# Patient Record
Sex: MALE | Race: ASIAN | NOT HISPANIC OR LATINO | URBAN - METROPOLITAN AREA
[De-identification: names, ages, dates, MRNs, and addresses within clinical notes are randomized per-mention and may not be internally consistent; named-entity substitution may affect disease eponyms.]

---

## 2017-03-19 ENCOUNTER — EMERGENCY (EMERGENCY)
Facility: HOSPITAL | Age: 42
LOS: 1 days | Discharge: HOME | End: 2017-03-19
Attending: EMERGENCY MEDICINE | Admitting: EMERGENCY MEDICINE
Payer: COMMERCIAL

## 2017-03-19 VITALS
SYSTOLIC BLOOD PRESSURE: 212 MMHG | HEART RATE: 104 BPM | OXYGEN SATURATION: 99 % | RESPIRATION RATE: 16 BRPM | DIASTOLIC BLOOD PRESSURE: 143 MMHG

## 2017-03-19 VITALS
SYSTOLIC BLOOD PRESSURE: 199 MMHG | TEMPERATURE: 98 F | HEART RATE: 107 BPM | RESPIRATION RATE: 18 BRPM | OXYGEN SATURATION: 100 % | DIASTOLIC BLOOD PRESSURE: 152 MMHG

## 2017-03-19 LAB
ALBUMIN SERPL ELPH-MCNC: 4.2 G/DL — SIGNIFICANT CHANGE UP (ref 3.3–5)
ALP SERPL-CCNC: 79 U/L — SIGNIFICANT CHANGE UP (ref 40–120)
ALT FLD-CCNC: 17 U/L — SIGNIFICANT CHANGE UP (ref 4–41)
APPEARANCE UR: CLEAR — SIGNIFICANT CHANGE UP
APTT BLD: 35.5 SEC — SIGNIFICANT CHANGE UP (ref 27.5–37.4)
AST SERPL-CCNC: 17 U/L — SIGNIFICANT CHANGE UP (ref 4–40)
BASOPHILS # BLD AUTO: 0.02 K/UL — SIGNIFICANT CHANGE UP (ref 0–0.2)
BASOPHILS NFR BLD AUTO: 0.3 % — SIGNIFICANT CHANGE UP (ref 0–2)
BILIRUB SERPL-MCNC: 0.4 MG/DL — SIGNIFICANT CHANGE UP (ref 0.2–1.2)
BILIRUB UR-MCNC: NEGATIVE — SIGNIFICANT CHANGE UP
BLOOD UR QL VISUAL: HIGH
BUN SERPL-MCNC: 14 MG/DL — SIGNIFICANT CHANGE UP (ref 7–23)
CALCIUM SERPL-MCNC: 8.9 MG/DL — SIGNIFICANT CHANGE UP (ref 8.4–10.5)
CHLORIDE SERPL-SCNC: 101 MMOL/L — SIGNIFICANT CHANGE UP (ref 98–107)
CK MB BLD-MCNC: 1.88 NG/ML — SIGNIFICANT CHANGE UP (ref 1–6.6)
CK MB BLD-MCNC: 1.89 NG/ML — SIGNIFICANT CHANGE UP (ref 1–6.6)
CK SERPL-CCNC: 110 U/L — SIGNIFICANT CHANGE UP (ref 30–200)
CK SERPL-CCNC: 110 U/L — SIGNIFICANT CHANGE UP (ref 30–200)
CO2 SERPL-SCNC: 23 MMOL/L — SIGNIFICANT CHANGE UP (ref 22–31)
COLOR SPEC: SIGNIFICANT CHANGE UP
CREAT SERPL-MCNC: 1.52 MG/DL — HIGH (ref 0.5–1.3)
EOSINOPHIL # BLD AUTO: 0.16 K/UL — SIGNIFICANT CHANGE UP (ref 0–0.5)
EOSINOPHIL NFR BLD AUTO: 2.1 % — SIGNIFICANT CHANGE UP (ref 0–6)
GLUCOSE SERPL-MCNC: 127 MG/DL — HIGH (ref 70–99)
GLUCOSE UR-MCNC: NEGATIVE — SIGNIFICANT CHANGE UP
HCT VFR BLD CALC: 47.1 % — SIGNIFICANT CHANGE UP (ref 39–50)
HGB BLD-MCNC: 15.8 G/DL — SIGNIFICANT CHANGE UP (ref 13–17)
IMM GRANULOCYTES NFR BLD AUTO: 0.1 % — SIGNIFICANT CHANGE UP (ref 0–1.5)
INR BLD: 0.99 — SIGNIFICANT CHANGE UP (ref 0.88–1.17)
KETONES UR-MCNC: NEGATIVE — SIGNIFICANT CHANGE UP
LEUKOCYTE ESTERASE UR-ACNC: NEGATIVE — SIGNIFICANT CHANGE UP
LYMPHOCYTES # BLD AUTO: 2.18 K/UL — SIGNIFICANT CHANGE UP (ref 1–3.3)
LYMPHOCYTES # BLD AUTO: 28.4 % — SIGNIFICANT CHANGE UP (ref 13–44)
MCHC RBC-ENTMCNC: 29 PG — SIGNIFICANT CHANGE UP (ref 27–34)
MCHC RBC-ENTMCNC: 33.5 % — SIGNIFICANT CHANGE UP (ref 32–36)
MCV RBC AUTO: 86.6 FL — SIGNIFICANT CHANGE UP (ref 80–100)
MONOCYTES # BLD AUTO: 0.45 K/UL — SIGNIFICANT CHANGE UP (ref 0–0.9)
MONOCYTES NFR BLD AUTO: 5.9 % — SIGNIFICANT CHANGE UP (ref 2–14)
NEUTROPHILS # BLD AUTO: 4.85 K/UL — SIGNIFICANT CHANGE UP (ref 1.8–7.4)
NEUTROPHILS NFR BLD AUTO: 63.2 % — SIGNIFICANT CHANGE UP (ref 43–77)
NITRITE UR-MCNC: NEGATIVE — SIGNIFICANT CHANGE UP
NT-PROBNP SERPL-SCNC: 144.7 PG/ML — SIGNIFICANT CHANGE UP
PH UR: 7 — SIGNIFICANT CHANGE UP (ref 4.6–8)
PLATELET # BLD AUTO: 232 K/UL — SIGNIFICANT CHANGE UP (ref 150–400)
PMV BLD: 11.3 FL — SIGNIFICANT CHANGE UP (ref 7–13)
POTASSIUM SERPL-MCNC: 3.8 MMOL/L — SIGNIFICANT CHANGE UP (ref 3.5–5.3)
POTASSIUM SERPL-SCNC: 3.8 MMOL/L — SIGNIFICANT CHANGE UP (ref 3.5–5.3)
PROT SERPL-MCNC: 7.1 G/DL — SIGNIFICANT CHANGE UP (ref 6–8.3)
PROT UR-MCNC: 30 — SIGNIFICANT CHANGE UP
PROTHROM AB SERPL-ACNC: 11.1 SEC — SIGNIFICANT CHANGE UP (ref 9.8–13.1)
RBC # BLD: 5.44 M/UL — SIGNIFICANT CHANGE UP (ref 4.2–5.8)
RBC # FLD: 13 % — SIGNIFICANT CHANGE UP (ref 10.3–14.5)
RBC CASTS # UR COMP ASSIST: SIGNIFICANT CHANGE UP (ref 0–?)
SODIUM SERPL-SCNC: 141 MMOL/L — SIGNIFICANT CHANGE UP (ref 135–145)
SP GR SPEC: 1.03 — SIGNIFICANT CHANGE UP (ref 1–1.03)
SQUAMOUS # UR AUTO: SIGNIFICANT CHANGE UP
TROPONIN T SERPL-MCNC: < 0.06 NG/ML — SIGNIFICANT CHANGE UP (ref 0–0.06)
TROPONIN T SERPL-MCNC: < 0.06 NG/ML — SIGNIFICANT CHANGE UP (ref 0–0.06)
UROBILINOGEN FLD QL: NORMAL E.U. — SIGNIFICANT CHANGE UP (ref 0.1–0.2)
WBC # BLD: 7.67 K/UL — SIGNIFICANT CHANGE UP (ref 3.8–10.5)
WBC # FLD AUTO: 7.67 K/UL — SIGNIFICANT CHANGE UP (ref 3.8–10.5)
WBC UR QL: SIGNIFICANT CHANGE UP (ref 0–?)

## 2017-03-19 PROCEDURE — 93010 ELECTROCARDIOGRAM REPORT: CPT | Mod: 59

## 2017-03-19 PROCEDURE — 74174 CTA ABD&PLVS W/CONTRAST: CPT | Mod: 26

## 2017-03-19 PROCEDURE — 71020: CPT | Mod: 26

## 2017-03-19 PROCEDURE — 71275 CT ANGIOGRAPHY CHEST: CPT | Mod: 26

## 2017-03-19 PROCEDURE — 99285 EMERGENCY DEPT VISIT HI MDM: CPT | Mod: 25

## 2017-03-19 RX ORDER — LISINOPRIL 2.5 MG/1
1 TABLET ORAL
Qty: 30 | Refills: 0 | OUTPATIENT
Start: 2017-03-19 | End: 2017-04-18

## 2017-03-19 RX ORDER — LISINOPRIL 2.5 MG/1
10 TABLET ORAL DAILY
Qty: 0 | Refills: 0 | Status: DISCONTINUED | OUTPATIENT
Start: 2017-03-19 | End: 2017-03-23

## 2017-03-19 RX ORDER — NIFEDIPINE 30 MG
1 TABLET, EXTENDED RELEASE 24 HR ORAL
Qty: 30 | Refills: 0 | OUTPATIENT
Start: 2017-03-19 | End: 2017-04-18

## 2017-03-19 RX ORDER — NIFEDIPINE 30 MG
60 TABLET, EXTENDED RELEASE 24 HR ORAL ONCE
Qty: 0 | Refills: 0 | Status: COMPLETED | OUTPATIENT
Start: 2017-03-19 | End: 2017-03-19

## 2017-03-19 RX ADMIN — LISINOPRIL 10 MILLIGRAM(S): 2.5 TABLET ORAL at 21:13

## 2017-03-19 RX ADMIN — Medication 60 MILLIGRAM(S): at 17:36

## 2017-03-19 NOTE — ED PROVIDER NOTE - PROGRESS NOTE DETAILS
Bp 194/135. Pt feeling better.  Serial EKGs unchanged, serial CE negative.  Will rx procardia and lisinopril.  Pt will try to f/u PMD tomrrow.

## 2017-03-19 NOTE — ED ADULT NURSE NOTE - OBJECTIVE STATEMENT
Pt recd A+Ox3. Sent from urgent care for high BP. Originally went to urgent care for abd pain that he felt when he was shoveling snow. Pt denies any CP, abd pain, N/V, SOB, HA, dizziness or any other adverse symptoms at this time. Reports he had some CP this morning but denies at this time. Pt is extremely hypertensive on assessment- MD at bedside aware. Cardiac monitor in place and labs sent. Will continue to monitor.

## 2017-03-19 NOTE — ED PROVIDER NOTE - OBJECTIVE STATEMENT
41 YOM PMH HTN on medication he cannot remember presented at  with epigastric pain sent to ED for HTN p/w no symptoms today.  Pt c/o epigastric pain episodic 3-4/10 which brought him to  today. Pt was hypertensive into the 240's sent to the ED.  Mild chest pain earlier today.  No active chest pain in the ED.  No shortness of breath, radiation to the arm, jaw, neck or back, worsening symptoms with exertion, headache, nausea, or vomiting.  No history of stress test, angiogram, echocardiogram, or follow up with a cardiologist.  No personal history of coronary artery disease, arrhythmia, or CHF.  No family history of coronary artery disease, sudden cardiac death, or arrhythmia.

## 2017-03-19 NOTE — ED PROVIDER NOTE - MEDICAL DECISION MAKING DETAILS
41 YOM PMH HTN on medication he cannot remember presented at  with epigastric pain sent to ED for HTN p/w no symptoms today. HTN.  Evaluate for hypertensive emergency, no clinical findings consistent with dissection, R/O ACS.  Treat symptomatically and reassess.

## 2017-03-19 NOTE — ED ADULT TRIAGE NOTE - CHIEF COMPLAINT QUOTE
pt sent from urgent care , for elevated BP. pt with a c/o chest pressure. pt denies sob. pt is non compliant with BP meds.   pmhx HTN.

## 2021-01-16 ENCOUNTER — EMERGENCY (EMERGENCY)
Facility: HOSPITAL | Age: 46
LOS: 1 days | Discharge: HOME | End: 2021-01-16
Attending: STUDENT IN AN ORGANIZED HEALTH CARE EDUCATION/TRAINING PROGRAM | Admitting: STUDENT IN AN ORGANIZED HEALTH CARE EDUCATION/TRAINING PROGRAM
Payer: COMMERCIAL

## 2021-01-16 VITALS
RESPIRATION RATE: 20 BRPM | OXYGEN SATURATION: 97 % | DIASTOLIC BLOOD PRESSURE: 76 MMHG | SYSTOLIC BLOOD PRESSURE: 110 MMHG | HEART RATE: 107 BPM | TEMPERATURE: 98 F

## 2021-01-16 VITALS
DIASTOLIC BLOOD PRESSURE: 78 MMHG | HEART RATE: 90 BPM | SYSTOLIC BLOOD PRESSURE: 112 MMHG | OXYGEN SATURATION: 98 % | RESPIRATION RATE: 20 BRPM

## 2021-01-16 PROBLEM — I10 ESSENTIAL (PRIMARY) HYPERTENSION: Chronic | Status: ACTIVE | Noted: 2017-03-19

## 2021-01-16 LAB
ALBUMIN SERPL ELPH-MCNC: 4.1 G/DL — SIGNIFICANT CHANGE UP (ref 3.3–5)
ALP SERPL-CCNC: 70 U/L — SIGNIFICANT CHANGE UP (ref 40–120)
ALT FLD-CCNC: 70 U/L — HIGH (ref 4–41)
ANION GAP SERPL CALC-SCNC: 14 MMOL/L — SIGNIFICANT CHANGE UP (ref 7–14)
APTT BLD: 34.8 SEC — SIGNIFICANT CHANGE UP (ref 27–36.3)
AST SERPL-CCNC: 74 U/L — HIGH (ref 4–40)
BASE EXCESS BLDV CALC-SCNC: 0.9 MMOL/L — SIGNIFICANT CHANGE UP (ref -3–2)
BASOPHILS # BLD AUTO: 0.01 K/UL — SIGNIFICANT CHANGE UP (ref 0–0.2)
BASOPHILS NFR BLD AUTO: 0.2 % — SIGNIFICANT CHANGE UP (ref 0–2)
BILIRUB SERPL-MCNC: 0.5 MG/DL — SIGNIFICANT CHANGE UP (ref 0.2–1.2)
BLOOD GAS VENOUS - CREATININE: 1.4 MG/DL — HIGH (ref 0.5–1.3)
BLOOD GAS VENOUS COMPREHENSIVE RESULT: SIGNIFICANT CHANGE UP
BUN SERPL-MCNC: 18 MG/DL — SIGNIFICANT CHANGE UP (ref 7–23)
CALCIUM SERPL-MCNC: 8.8 MG/DL — SIGNIFICANT CHANGE UP (ref 8.4–10.5)
CHLORIDE BLDV-SCNC: 105 MMOL/L — SIGNIFICANT CHANGE UP (ref 96–108)
CHLORIDE SERPL-SCNC: 98 MMOL/L — SIGNIFICANT CHANGE UP (ref 98–107)
CO2 SERPL-SCNC: 23 MMOL/L — SIGNIFICANT CHANGE UP (ref 22–31)
CREAT SERPL-MCNC: 1.52 MG/DL — HIGH (ref 0.5–1.3)
CRP SERPL-MCNC: 71.9 MG/L — HIGH
D DIMER BLD IA.RAPID-MCNC: 255 NG/ML DDU — HIGH
EOSINOPHIL # BLD AUTO: 0 K/UL — SIGNIFICANT CHANGE UP (ref 0–0.5)
EOSINOPHIL NFR BLD AUTO: 0 % — SIGNIFICANT CHANGE UP (ref 0–6)
FERRITIN SERPL-MCNC: 1360 NG/ML — HIGH (ref 30–400)
GAS PNL BLDV: 132 MMOL/L — LOW (ref 136–146)
GLUCOSE BLDV-MCNC: 105 MG/DL — HIGH (ref 70–99)
GLUCOSE SERPL-MCNC: 103 MG/DL — HIGH (ref 70–99)
HCO3 BLDV-SCNC: 24 MMOL/L — SIGNIFICANT CHANGE UP (ref 20–27)
HCT VFR BLD CALC: 44.2 % — SIGNIFICANT CHANGE UP (ref 39–50)
HCT VFR BLDA CALC: 46.4 % — SIGNIFICANT CHANGE UP (ref 39–51)
HGB BLD CALC-MCNC: 15.1 G/DL — SIGNIFICANT CHANGE UP (ref 13–17)
HGB BLD-MCNC: 14.4 G/DL — SIGNIFICANT CHANGE UP (ref 13–17)
IANC: 4.13 K/UL — SIGNIFICANT CHANGE UP (ref 1.5–8.5)
IMM GRANULOCYTES NFR BLD AUTO: 0.5 % — SIGNIFICANT CHANGE UP (ref 0–1.5)
INR BLD: 1.23 RATIO — HIGH (ref 0.88–1.16)
LACTATE BLDV-MCNC: 1.3 MMOL/L — SIGNIFICANT CHANGE UP (ref 0.5–2)
LYMPHOCYTES # BLD AUTO: 1.31 K/UL — SIGNIFICANT CHANGE UP (ref 1–3.3)
LYMPHOCYTES # BLD AUTO: 21.8 % — SIGNIFICANT CHANGE UP (ref 13–44)
MCHC RBC-ENTMCNC: 28.2 PG — SIGNIFICANT CHANGE UP (ref 27–34)
MCHC RBC-ENTMCNC: 32.6 GM/DL — SIGNIFICANT CHANGE UP (ref 32–36)
MCV RBC AUTO: 86.5 FL — SIGNIFICANT CHANGE UP (ref 80–100)
MONOCYTES # BLD AUTO: 0.54 K/UL — SIGNIFICANT CHANGE UP (ref 0–0.9)
MONOCYTES NFR BLD AUTO: 9 % — SIGNIFICANT CHANGE UP (ref 2–14)
NEUTROPHILS # BLD AUTO: 4.13 K/UL — SIGNIFICANT CHANGE UP (ref 1.8–7.4)
NEUTROPHILS NFR BLD AUTO: 68.5 % — SIGNIFICANT CHANGE UP (ref 43–77)
NRBC # BLD: 0 /100 WBCS — SIGNIFICANT CHANGE UP
NRBC # FLD: 0 K/UL — SIGNIFICANT CHANGE UP
PCO2 BLDV: 42 MMHG — SIGNIFICANT CHANGE UP (ref 41–51)
PH BLDV: 7.4 — SIGNIFICANT CHANGE UP (ref 7.32–7.43)
PLATELET # BLD AUTO: 189 K/UL — SIGNIFICANT CHANGE UP (ref 150–400)
PO2 BLDV: 38 MMHG — SIGNIFICANT CHANGE UP (ref 35–40)
POTASSIUM BLDV-SCNC: 3.8 MMOL/L — SIGNIFICANT CHANGE UP (ref 3.4–4.5)
POTASSIUM SERPL-MCNC: 4.2 MMOL/L — SIGNIFICANT CHANGE UP (ref 3.5–5.3)
POTASSIUM SERPL-SCNC: 4.2 MMOL/L — SIGNIFICANT CHANGE UP (ref 3.5–5.3)
PROT SERPL-MCNC: 7.8 G/DL — SIGNIFICANT CHANGE UP (ref 6–8.3)
PROTHROM AB SERPL-ACNC: 13.9 SEC — HIGH (ref 10.6–13.6)
RBC # BLD: 5.11 M/UL — SIGNIFICANT CHANGE UP (ref 4.2–5.8)
RBC # FLD: 12.8 % — SIGNIFICANT CHANGE UP (ref 10.3–14.5)
SAO2 % BLDV: 66.2 % — SIGNIFICANT CHANGE UP (ref 60–85)
SARS-COV-2 RNA SPEC QL NAA+PROBE: DETECTED
SODIUM SERPL-SCNC: 135 MMOL/L — SIGNIFICANT CHANGE UP (ref 135–145)
WBC # BLD: 6.02 K/UL — SIGNIFICANT CHANGE UP (ref 3.8–10.5)
WBC # FLD AUTO: 6.02 K/UL — SIGNIFICANT CHANGE UP (ref 3.8–10.5)

## 2021-01-16 PROCEDURE — 93010 ELECTROCARDIOGRAM REPORT: CPT

## 2021-01-16 PROCEDURE — 99284 EMERGENCY DEPT VISIT MOD MDM: CPT

## 2021-01-16 PROCEDURE — 71045 X-RAY EXAM CHEST 1 VIEW: CPT | Mod: 26

## 2021-01-16 RX ORDER — SODIUM CHLORIDE 9 MG/ML
1000 INJECTION, SOLUTION INTRAVENOUS ONCE
Refills: 0 | Status: COMPLETED | OUTPATIENT
Start: 2021-01-16 | End: 2021-01-16

## 2021-01-16 RX ORDER — ACETAMINOPHEN 500 MG
975 TABLET ORAL ONCE
Refills: 0 | Status: COMPLETED | OUTPATIENT
Start: 2021-01-16 | End: 2021-01-16

## 2021-01-16 RX ADMIN — SODIUM CHLORIDE 1000 MILLILITER(S): 9 INJECTION, SOLUTION INTRAVENOUS at 16:00

## 2021-01-16 RX ADMIN — Medication 975 MILLIGRAM(S): at 16:00

## 2021-01-16 NOTE — ED ADULT NURSE NOTE - OBJECTIVE STATEMENT
44 y/o male presents to ED with c/o shortness of breath.  Pt states that he was Covid + from 1/7/21, has been monitoring his pulse ox at home and has been 96-98%.  Pt states that he started having some difficulty taking a deep breath, pt states that when he takes a deep breath he gets a tickle feeling and coughs.  Pt states that his pulse ox dropped to 89% last night.  Pt states that he called his PMD this morning and he was told to come to ED for evaluation and imaging.  Pt awake alert in NAD, speaking full sentences without difficulty, pt slightly tachypneic 24-26.  SaO2 98% on RA decreases to 96% when speaking and returns to baseline quickly.  IV established labs drawn and sent.  Pt placed on CM> NSR>ST.  Providers at bedside to eval pt.

## 2021-01-16 NOTE — ED PROVIDER NOTE - PHYSICAL EXAMINATION
GENERAL: NAD, conversant, speaking full sentences and not using any accessory respiratory muscles  CHEST/LUNG: Clear to auscultation bilaterally anteriorly; No crackles, rhonchi, wheezing, or rubs  HEART: Regular rate and rhythm; No murmurs, rubs, or gallops  EXTREMITIES:  2+ Peripheral Pulses, No clubbing, cyanosis, or edema  SKIN: No rashes or lesions  NERVOUS SYSTEM:  Alert & Oriented, Good concentration

## 2021-01-16 NOTE — ED ADULT NURSE NOTE - NSFALLRSKHARMRISK_ED_ALL_ED
Pt suspected oropharyngeal dysphagia. Pt exhibited reduced oral grading. Delayed oral transit & a-p transport. Initiation of swallow delayed w/reduced laryngeal elevation. Coughed post swallow w/mechanical soft consistency & thin liquids. Audible swallows. Vocal quality strained post swallow at times.
no

## 2021-01-16 NOTE — ED ADULT TRIAGE NOTE - CHIEF COMPLAINT QUOTE
Pt c/o SOB x2 days.  Spoke with MD today who advised pt go to the ER for CXR.  Covid+ 1/7.  +cough and heaviness in chest.  Monitors O2 sat at home and has been 89% at night

## 2021-01-16 NOTE — ED PROVIDER NOTE - NSFOLLOWUPINSTRUCTIONS_ED_ALL_ED_FT
- Please monitor your breathing and oxygen percentage/numbers, if either getting much worse, please seek medical attention immediately in the emergency room or call 911.  - Please follow-up with your primary doctor regarding your progress.  - You will be contacted by a [facility] which will also help monitor your respiratory status/COVID symptoms.  - Take over the counter treatments for fever and cough.

## 2021-01-16 NOTE — ED PROVIDER NOTE - ATTENDING CONTRIBUTION TO CARE
45M known covid+, on exam with 96% sat, reports had gotten to high 80s last night, on exam relatively well appearing, non-tachypneic, states feels slight tightness in chest, will check labs, cxr, follow up studies, reassess, dispo.

## 2021-01-16 NOTE — ED PROVIDER NOTE - PATIENT PORTAL LINK FT
You can access the FollowMyHealth Patient Portal offered by [facility] by registering at the following website: [email or web address]. By joining [employer]’s FollowMyHealth portal, you will also be able to view your health information using other applications (apps) compatible with our system.

## 2021-01-16 NOTE — ED PROVIDER NOTE - CLINICAL SUMMARY MEDICAL DECISION MAKING FREE TEXT BOX
44 yo M with hx HTN, known COVID + 1/7/21, p/w new SOB starting yesterday and SpO2 89% at night, sent in by PCP to get a CXR. Well appearing, satting 98% on RA, desat to 96% when speaking. Will get CXR. Will get routine labs. Will get amb sat. 44 yo M with hx HTN, known COVID + 1/7/21, p/w new SOB starting yesterday and SpO2 89% at night, sent in by PCP to get a CXR. Well appearing, satting 98% on RA, desat to 96% when speaking. Will get CXR. Will get routine labs. Will get amb sat.  See progress note above.

## 2021-01-16 NOTE — ED PROVIDER NOTE - PROGRESS NOTE DETAILS
SpO2 at rest 95% on bed. On ambulation SpO2 marcela 94%, pt without SOB.  Plan to discharge with f/u with [facility] program.

## 2021-01-16 NOTE — ED PROVIDER NOTE - OBJECTIVE STATEMENT
44 yo M with hx HTN, known COVID + 1/7/21, p/w new SOB starting yesterday and SpO2 89% at night, sent in by PCP to get a CXR.    Onset of symptoms ~ 1/7/21, had fever, chills, muscle aches, weakness and loss of smell. Has been monitoring SpO2 at home - good numbers. Was not prescribed any medications by his PCP for the COVID. Noted SOB starting yesterday, mild, and some chest tightness, mild. Noted SpO2 89% last night. Spoke with PCP, who recommended going to ED for a CXR. ROS otherwise negative.

## 2021-03-08 NOTE — ED PROVIDER NOTE - PHYSICAL EXAMINATION
Left message for pt to call office Hypertension.  Equal pulses bilaterally. No focal neurological sign.  Pt resting comfortably in bed.

## 2021-06-22 NOTE — ED PROVIDER NOTE - NS ED ROS FT
Dx: partial medial menisectomy with lateral femoral condyle microfracture on 4/27/2021          Authorized # of Visits:  [id number]         Next MD visit: June 3rd 2021  Fall Risk: standard         Precautions: TDWB R LE X 6 wks            Subjective: Pt states hi bike X 5 mins Recumbent bike X 5 mins Recumbent bike X 5 mins Recumbent bike X 5 mins level 4    Quad sets X 20 Quad sets X 20 Quad sets X 20 Quad sets X 20 Long arc quad  short sitting x 20 R   Walking with one crutch on L    SLR 4 X 5 SLR 4 X 5 SLR 2 X 1 CONSTITUTIONAL: + weakness, fevers or chills  EYES/ENT: No visual changes;  No vertigo or throat pain   NECK: No pain or stiffness  RESPIRATORY: No cough, wheezing, hemoptysis; + shortness of breath  CARDIOVASCULAR: No chest pain or palpitations. + chest tightness  GASTROINTESTINAL: No abdominal or epigastric pain. No nausea, vomiting, or hematemesis; No diarrhea or constipation. No melena or hematochezia.  GENITOURINARY: No dysuria, frequency or hematuria  NEUROLOGICAL: No numbness or weakness  SKIN: No itching, burning, rashes, or lesions   PSYCH: no hx depression, no hx anxiety  All other review of systems is negative unless indicated above.

## 2022-11-04 NOTE — ED ADULT NURSE NOTE - ATTEMPT TO OOB
no Bed in lowest position, wheels locked, appropriate side rails in place/Call bell, personal items and telephone in reach/Instruct patient to call for assistance before getting out of bed or chair/Non-slip footwear when patient is out of bed/[place] to call system/Physically safe environment - no spills, clutter or unnecessary equipment/Purposeful Proactive Rounding/Room/bathroom lighting operational, light cord in reach

## 2023-06-19 PROBLEM — Z00.00 ENCOUNTER FOR PREVENTIVE HEALTH EXAMINATION: Status: ACTIVE | Noted: 2023-06-19

## 2024-01-24 ENCOUNTER — NON-APPOINTMENT (OUTPATIENT)
Age: 49
End: 2024-01-24

## 2024-01-25 ENCOUNTER — APPOINTMENT (OUTPATIENT)
Dept: BARIATRICS | Facility: CLINIC | Age: 49
End: 2024-01-25
Payer: COMMERCIAL

## 2024-01-25 ENCOUNTER — NON-APPOINTMENT (OUTPATIENT)
Age: 49
End: 2024-01-25

## 2024-01-25 VITALS
HEART RATE: 76 BPM | DIASTOLIC BLOOD PRESSURE: 86 MMHG | HEIGHT: 65 IN | BODY MASS INDEX: 36.15 KG/M2 | WEIGHT: 217 LBS | OXYGEN SATURATION: 99 % | TEMPERATURE: 96.7 F | SYSTOLIC BLOOD PRESSURE: 140 MMHG

## 2024-01-25 DIAGNOSIS — Z13.228 ENCOUNTER FOR SCREENING FOR OTHER SUSPECTED ENDOCRINE DISORDER: ICD-10-CM

## 2024-01-25 DIAGNOSIS — R63.8 OTHER SYMPTOMS AND SIGNS CONCERNING FOOD AND FLUID INTAKE: ICD-10-CM

## 2024-01-25 DIAGNOSIS — E46 UNSPECIFIED PROTEIN-CALORIE MALNUTRITION: ICD-10-CM

## 2024-01-25 DIAGNOSIS — I10 ESSENTIAL (PRIMARY) HYPERTENSION: ICD-10-CM

## 2024-01-25 DIAGNOSIS — Z78.9 OTHER SPECIFIED HEALTH STATUS: ICD-10-CM

## 2024-01-25 DIAGNOSIS — Z13.29 ENCOUNTER FOR SCREENING FOR OTHER SUSPECTED ENDOCRINE DISORDER: ICD-10-CM

## 2024-01-25 DIAGNOSIS — Z01.818 ENCOUNTER FOR OTHER PREPROCEDURAL EXAMINATION: ICD-10-CM

## 2024-01-25 DIAGNOSIS — R63.5 ABNORMAL WEIGHT GAIN: ICD-10-CM

## 2024-01-25 DIAGNOSIS — E66.9 OBESITY, UNSPECIFIED: ICD-10-CM

## 2024-01-25 DIAGNOSIS — Z13.0 ENCOUNTER FOR SCREENING FOR OTHER SUSPECTED ENDOCRINE DISORDER: ICD-10-CM

## 2024-01-25 PROCEDURE — 99205 OFFICE O/P NEW HI 60 MIN: CPT

## 2024-01-25 RX ORDER — NIFEDIPINE 60 MG
60 TABLET, EXTENDED RELEASE ORAL DAILY
Refills: 0 | Status: ACTIVE | COMMUNITY

## 2024-01-25 RX ORDER — LISINOPRIL 10 MG/1
10 TABLET ORAL DAILY
Refills: 0 | Status: ACTIVE | COMMUNITY

## 2024-02-15 ENCOUNTER — EMERGENCY (EMERGENCY)
Facility: HOSPITAL | Age: 49
LOS: 1 days | Discharge: HOME | End: 2024-02-15
Attending: EMERGENCY MEDICINE | Admitting: EMERGENCY MEDICINE
Payer: COMMERCIAL

## 2024-02-15 VITALS
SYSTOLIC BLOOD PRESSURE: 160 MMHG | HEART RATE: 104 BPM | DIASTOLIC BLOOD PRESSURE: 98 MMHG | TEMPERATURE: 99 F | RESPIRATION RATE: 18 BRPM | OXYGEN SATURATION: 96 %

## 2024-02-15 LAB
ALBUMIN SERPL ELPH-MCNC: 4.2 G/DL — SIGNIFICANT CHANGE UP (ref 3.3–5)
ALP SERPL-CCNC: 72 U/L — SIGNIFICANT CHANGE UP (ref 40–120)
ALT FLD-CCNC: 13 U/L — SIGNIFICANT CHANGE UP (ref 4–41)
ANION GAP SERPL CALC-SCNC: 13 MMOL/L — SIGNIFICANT CHANGE UP (ref 7–14)
AST SERPL-CCNC: 14 U/L — SIGNIFICANT CHANGE UP (ref 4–40)
BASOPHILS # BLD AUTO: 0.05 K/UL — SIGNIFICANT CHANGE UP (ref 0–0.2)
BASOPHILS NFR BLD AUTO: 0.4 % — SIGNIFICANT CHANGE UP (ref 0–2)
BILIRUB SERPL-MCNC: 0.8 MG/DL — SIGNIFICANT CHANGE UP (ref 0.2–1.2)
BUN SERPL-MCNC: 15 MG/DL — SIGNIFICANT CHANGE UP (ref 7–23)
CALCIUM SERPL-MCNC: 9.2 MG/DL — SIGNIFICANT CHANGE UP (ref 8.4–10.5)
CHLORIDE SERPL-SCNC: 101 MMOL/L — SIGNIFICANT CHANGE UP (ref 98–107)
CO2 SERPL-SCNC: 25 MMOL/L — SIGNIFICANT CHANGE UP (ref 22–31)
CREAT SERPL-MCNC: 1.5 MG/DL — HIGH (ref 0.5–1.3)
EGFR: 58 ML/MIN/1.73M2 — LOW
EOSINOPHIL # BLD AUTO: 0.07 K/UL — SIGNIFICANT CHANGE UP (ref 0–0.5)
EOSINOPHIL NFR BLD AUTO: 0.6 % — SIGNIFICANT CHANGE UP (ref 0–6)
GLUCOSE SERPL-MCNC: 116 MG/DL — HIGH (ref 70–99)
HCT VFR BLD CALC: 46.3 % — SIGNIFICANT CHANGE UP (ref 39–50)
HGB BLD-MCNC: 15.1 G/DL — SIGNIFICANT CHANGE UP (ref 13–17)
IANC: 8.49 K/UL — HIGH (ref 1.8–7.4)
IMM GRANULOCYTES NFR BLD AUTO: 0.3 % — SIGNIFICANT CHANGE UP (ref 0–0.9)
LYMPHOCYTES # BLD AUTO: 2.5 K/UL — SIGNIFICANT CHANGE UP (ref 1–3.3)
LYMPHOCYTES # BLD AUTO: 20.2 % — SIGNIFICANT CHANGE UP (ref 13–44)
MCHC RBC-ENTMCNC: 28.7 PG — SIGNIFICANT CHANGE UP (ref 27–34)
MCHC RBC-ENTMCNC: 32.6 GM/DL — SIGNIFICANT CHANGE UP (ref 32–36)
MCV RBC AUTO: 88 FL — SIGNIFICANT CHANGE UP (ref 80–100)
MONOCYTES # BLD AUTO: 1.24 K/UL — HIGH (ref 0–0.9)
MONOCYTES NFR BLD AUTO: 10 % — SIGNIFICANT CHANGE UP (ref 2–14)
NEUTROPHILS # BLD AUTO: 8.49 K/UL — HIGH (ref 1.8–7.4)
NEUTROPHILS NFR BLD AUTO: 68.5 % — SIGNIFICANT CHANGE UP (ref 43–77)
NRBC # BLD: 0 /100 WBCS — SIGNIFICANT CHANGE UP (ref 0–0)
NRBC # FLD: 0 K/UL — SIGNIFICANT CHANGE UP (ref 0–0)
PLATELET # BLD AUTO: 275 K/UL — SIGNIFICANT CHANGE UP (ref 150–400)
POTASSIUM SERPL-MCNC: 4.9 MMOL/L — SIGNIFICANT CHANGE UP (ref 3.5–5.3)
POTASSIUM SERPL-SCNC: 4.9 MMOL/L — SIGNIFICANT CHANGE UP (ref 3.5–5.3)
PROT SERPL-MCNC: 7.7 G/DL — SIGNIFICANT CHANGE UP (ref 6–8.3)
RBC # BLD: 5.26 M/UL — SIGNIFICANT CHANGE UP (ref 4.2–5.8)
RBC # FLD: 13.1 % — SIGNIFICANT CHANGE UP (ref 10.3–14.5)
SODIUM SERPL-SCNC: 139 MMOL/L — SIGNIFICANT CHANGE UP (ref 135–145)
WBC # BLD: 12.39 K/UL — HIGH (ref 3.8–10.5)
WBC # FLD AUTO: 12.39 K/UL — HIGH (ref 3.8–10.5)

## 2024-02-15 PROCEDURE — 70487 CT MAXILLOFACIAL W/DYE: CPT | Mod: 26,MA

## 2024-02-15 PROCEDURE — 99284 EMERGENCY DEPT VISIT MOD MDM: CPT | Mod: GC

## 2024-02-15 PROCEDURE — 99283 EMERGENCY DEPT VISIT LOW MDM: CPT

## 2024-02-15 PROCEDURE — 99223 1ST HOSP IP/OBS HIGH 75: CPT

## 2024-02-15 RX ORDER — CEFTRIAXONE 500 MG/1
1000 INJECTION, POWDER, FOR SOLUTION INTRAMUSCULAR; INTRAVENOUS ONCE
Refills: 0 | Status: COMPLETED | OUTPATIENT
Start: 2024-02-15 | End: 2024-02-15

## 2024-02-15 RX ORDER — NIFEDIPINE 30 MG
60 TABLET, EXTENDED RELEASE 24 HR ORAL DAILY
Refills: 0 | Status: DISCONTINUED | OUTPATIENT
Start: 2024-02-15 | End: 2024-02-18

## 2024-02-15 RX ORDER — LISINOPRIL 2.5 MG/1
10 TABLET ORAL DAILY
Refills: 0 | Status: DISCONTINUED | OUTPATIENT
Start: 2024-02-15 | End: 2024-02-18

## 2024-02-15 RX ADMIN — CEFTRIAXONE 100 MILLIGRAM(S): 500 INJECTION, POWDER, FOR SOLUTION INTRAMUSCULAR; INTRAVENOUS at 09:01

## 2024-02-15 RX ADMIN — Medication 100 MILLIGRAM(S): at 10:40

## 2024-02-15 RX ADMIN — Medication 100 MILLIGRAM(S): at 22:14

## 2024-02-15 NOTE — ED PROVIDER NOTE - CLINICAL SUMMARY MEDICAL DECISION MAKING FREE TEXT BOX
worrisome for facial abscess. Pt easily protecting airway, no concern for PTA or ludwigs  Plan for labs, CT maxface

## 2024-02-15 NOTE — ED CDU PROVIDER INITIAL DAY NOTE - DETAILS
48yM w/pmhx HTN presenting to the ED with right shin pain and swelling x 3 days. Pt states 3 days ago he noticed a pimple to the right chin, he did shave the area, on Tuesday it became red and swollen. Pt was seen at  2 days ago and started on doxycycline, he then saw a dermatologist yesterday who attempted drainage, was told there was no pus and to continue abx and warm compress. Pt came to ED due to increase in swelling.  In main ED WBC 12.39, Cr 1.5, CT showing "There is diffuse thickening of the buccal soft tissues along the right   side of the mandible with overlying cellulitis.  No discrete abscess or drainable fluid collection is visualized.  No large dental caries or periapical lucency is visualized.  Findings may be secondary to underlying gingival infection." Pt given doxy and ceftriaxone in ED.  Sent to CDU for derm, ID consults, abx to be given doxy and unasyn, outpt plastics follow up

## 2024-02-15 NOTE — CONSULT NOTE ADULT - ASSESSMENT
Assessment:     Plan:       The patient's chart was reviewed in addition to being seen and examined at bedside with the dermatology attending Dr. Howard.  Recommendations were communicated with the primary team.  Please page [phone] with a 10-digit call-back number for further related questions.    Thank you for allowing us to participate in the care of this patient. Dermatology will continue to follow     Dafne Gonzales MD, FAUSTINO  Resident Physician, PGY-3  [facility] Dermatology  Pager: [phone]  Office: [phone] Assessment: Erythematous, painful rash on the right chin, jawline and neck - favor cellulitis     Plan:   - Agree with abx per infectious disease team, will defer to ID regarding treatment course and duration   - F/u bacterial swab of crust from prior I&D incision     The patient's chart was reviewed in addition to being seen and examined at bedside with the dermatology attending Dr. Howard.  Recommendations were communicated with the primary team.    Dermatology to sign off at this time. Please notify us and re-consult as needed for new or concerning changes to skin exam.    Dafne Gonzales MD, FAUSTINO  Resident Physician, PGY-3  [facility] Dermatology  Pager: [phone]  Office: [phone] Assessment: Erythematous, painful rash on the right chin, jawline and neck - favor cellulitis     Plan:   - Agree with abx per infectious disease team, will defer to ID regarding treatment course and duration   - F/u bacterial swab of crust from prior I&D incision site    The patient's chart was reviewed in addition to being seen and examined at bedside with the dermatology attending Dr. Howard.  Recommendations were communicated with the primary team.    Dermatology to sign off at this time. Please notify us and re-consult as needed for new or concerning changes to skin exam.    Dafne Gonzales MD, FAUSTINO  Resident Physician, PGY-3  [facility] Dermatology  Pager: [phone]  Office: [phone]

## 2024-02-15 NOTE — ED ADULT TRIAGE NOTE - CHIEF COMPLAINT QUOTE
Patient c/o abscess to right lower face. Was seen at  yesterday and were unable to drain it. Endorsing fevers, chills and worsening swelling. Redness and swelling noted to face. Hx. HTN.

## 2024-02-15 NOTE — ED CDU PROVIDER INITIAL DAY NOTE - ATTENDING APP SHARED VISIT CONTRIBUTION OF CARE
The decision was made to admit this patient to the CDU as documented in my Provider note I have reviewed the note  written by the CDU Physician Assistant. I have supervised and participated as necessary in the performance of procedures indicated for patient management and was available at all phases of the patient´s visit when needed.    Please see the  provider note for the details of the decision to admit  Vital Signs Stable  PE unchanged at time of admission  Patient with antibiotics while awaiting reccs for antibiotic regimen vs addition of antifungal  await reccs and will reassess

## 2024-02-15 NOTE — ED ADULT NURSE REASSESSMENT NOTE - NS ED NURSE REASSESS COMMENT FT1
Pt lying comfortably in stretcher. Pt not in acute distress. Pt states infection on chin only hurts when it is touched, otherwise painless. Respirations are even and unlabored. Bed in lowest position, safety maintained.
patient AOX4, NAD, meds given as ordered. awaiting on dental consult and re eval. redness with dry wound to right chin noted. no pus or discharge noted.

## 2024-02-15 NOTE — ED PROVIDER NOTE - PHYSICAL EXAMINATION
Vitals: afebrile, notable for   on my exam   , but otherwise reassuring  Gen:  Well appearing in NAD  Head:  NC/AT  ENT: floor of mouth soft, mild trismus,, +R chin/medial jaw erythema & induration w/o area of fluctuance   Resp: No distress   Ext: no deformities  Skin: warm and dry as visualized

## 2024-02-15 NOTE — CONSULT NOTE ADULT - SUBJECTIVE AND OBJECTIVE BOX
Full consult to follow "HPI: 48-year-old male, history of hypertension on lisinopril, now presents with right shin redness and swelling x 2-3 days, associated with fevers and chills.  Patient was seen by the dermatologist yesterday they attempted but were unable to drain any significant pus.  They placed him on doxycycline.  Patient's symptoms have progressed and that prompted this ED visit.  Patient is saying he is starting to have some difficulty fully opening his mouth because his lip and chin are so swollen.  No problems phonating, controlling secretions, swelling of the floor of the mouth, problems swallowing, voice changes."    Above reviewed. 47 yo M HTN, initially with R chin swelling and redness  Fever, chills  Patient seen on the outside, by dermatology who drained site and obtained culture  Was given Doxy  Presented to ED for further care  Generally well appearing  ID called for further eval    PAST MEDICAL & SURGICAL HISTORY:  HTN (hypertension)    No significant past surgical history    Allergies    No Known Allergies    Intolerances    ANTIMICROBIALS:      OTHER MEDS:      SOCIAL HISTORY: No tobacco, no alcohol, no illicit drugs    FAMILY HISTORY:  No pertinent family history in first degree relatives relating to chief complaint    Drug Dosing Weight      PE:    Vital Signs Last 24 Hrs  T(C): 37 (15 Feb 2024 11:01), Max: 37.4 (15 Feb 2024 01:43)  T(F): 98.6 (15 Feb 2024 11:01), Max: 99.4 (15 Feb 2024 01:43)  HR: 91 (15 Feb 2024 11:01) (91 - 104)  BP: 126/83 (15 Feb 2024 11:01) (126/83 - 160/98)  BP(mean): 100 (15 Feb 2024 07:12) (100 - 100)  RR: 18 (15 Feb 2024 11:01) (16 - 18)  SpO2: 98% (15 Feb 2024 11:01) (96% - 98%)    Gen: AOx3, NAD, non-toxic, pleasant  CV: S1+S2 normal, nontachycardic  Resp: Clear bilat, no resp distress, no crackles/wheezes  Abd: Soft, nontender, +BS  Ext: No LE edema, no wounds  : No Ramos  IV/Skin: Chin lesion, mild swelling, no induration, scab  Msk: No low back pain, no arthralgias, no joint swelling  Neuro: No sensory deficits, no motor deficits    LABS:                        15.1   12.39 )-----------( 275      ( 15 Feb 2024 03:57 )             46.3     02-15    139  |  101  |  15  ----------------------------<  116<H>  4.9   |  25  |  1.50<H>    Ca    9.2      15 Feb 2024 03:57    TPro  7.7  /  Alb  4.2  /  TBili  0.8  /  DBili  x   /  AST  14  /  ALT  13  /  AlkPhos  72  02-15    Urinalysis Basic - ( 15 Feb 2024 03:57 )    Color: x / Appearance: x / SG: x / pH: x  Gluc: 116 mg/dL / Ketone: x  / Bili: x / Urobili: x   Blood: x / Protein: x / Nitrite: x   Leuk Esterase: x / RBC: x / WBC x   Sq Epi: x / Non Sq Epi: x / Bacteria: x    MICROBIOLOGY:    No new available    RADIOLOGY:    2/15 CT:    IMPRESSION:  There is diffuse thickening of the buccal soft tissues along the right   side of the mandible with overlying cellulitis.  No discrete abscess or   drainable fluid collection is visualized.  No large dental caries or   periapical lucency is visualized.  Findings may be secondary to   underlying gingival infection..

## 2024-02-15 NOTE — CONSULT NOTE ADULT - ATTENDING COMMENTS
Rash clinically consistent with cellulitis. Doubt a mimicker of cellulitis in this case. Agree with addition of cephalexin as per ID. Will defer antibiotic course to ID.    Dg Howard MD, PharmD, MPH  Co-Director, Inpatient Dermatology Consultation Service, [facility]

## 2024-02-15 NOTE — CONSULT NOTE ADULT - ASSESSMENT
49 yo M HTN, initially with R chin swelling and redness  Fever, leukocytosis  R chin swelling, initially pustule, then cut with razor while shaving  Dermatologist lanced site and obtained culture--not available  CT diffuse thickening of soft tissue R mandible--cellulitis, no abscess  Most likely staph based on history of pustule  Overall, Cellulitis, wound infection  - Doxycycline 100mg q 12, 7 day course  - Keflex 500mg q 6, 7 day course  - Monitor for alternate signs infection  - If DC planning, should follow up with dermatologist for final culture report  - Monitor site for improvement    Rosendo Mata MD  Contact on TEAMS messaging from 9am - 5pm  From 5pm-9am, on weekends, or if no response call [phone]

## 2024-02-15 NOTE — CONSULT NOTE ADULT - SUBJECTIVE AND OBJECTIVE BOX
HPI: 48 year old male with hx HTN presenting with left chin pain and swelling. Pt notes he noticed a pimple on the R chin on Monday 2/12. He shaved the area, noticed some pus draining from it the next day and presented to urgent care on 2/13. He was prescribed doxycycline 100mg BID and mupirocin and instructed to f/u with an outside dermatologist, which pt did on 2/14. The dermatologist attempted an I&D per the patient however "nothing came out". The patient then developed worsening pain and swelling prompting an ED visit.  Patient is saying he is starting to have some difficulty fully opening his mouth because his lip and chin are so swollen.        PAST MEDICAL & SURGICAL HISTORY:  HTN (hypertension)      No significant past surgical history          Review of Systems:  REVIEW OF SYSTEMS      General: no fevers/chills, no lethargy	    Skin/Breast: see HPI  	  Ophthalmologic: no eye pain or change in vision  	  ENMT: no dysphagia or change in hearing    Respiratory and Thorax: no SOB or cough  	  Cardiovascular: no palpitations or chest pain    Gastrointestinal: no abdominal pain or blood in stool     Genitourinary: no dysuria or frequency    Musculoskeletal: no joint pains or weakness	    Neurological: no weakness, numbness , or tingling    MEDICATIONS  (STANDING):    ALLERGIES: No Known Allergies        SOCIAL HISTORY:    Social History:    FAMILY HISTORY:  No pertinent family history in first degree relatives          VITAL SIGNS LAST 24 HOURS:  T(F): 98.9 (02-15 @ 13:57), Max: 99.4 (02-15 @ 01:43)  HR: 93 (02-15 @ 13:57) (91 - 104)  BP: 110/73 (02-15 @ 13:57) (110/73 - 160/98)  RR: 18 (02-15 @ 13:57) (16 - 18)    PHYSICAL EXAM:     The patient was alert and in no apparent distress.  OP showed no ulcerations  There was no visible lymphadenopathy.  Conjunctiva were non injected  There was no clubbing or edema of extremities.  The scalp, hair, face, eyebrows, lips, OP, neck, chest, back,   extremities X 4, nails were examined.  There was no hyperhidrosis or bromhidrosis.    Of note on skin exam:     Edema of the lower lip   Edematous, warm indurated erythematous plaque on the R chin and jawline extending to the inferior border of the cutaneous lip     ____________________________________    LABS:                        15.1   12.39 )-----------( 275      ( 15 Feb 2024 03:57 )             46.3     02-15    139  |  101  |  15  ----------------------------<  116<H>  4.9   |  25  |  1.50<H>    Ca    9.2      15 Feb 2024 03:57    TPro  7.7  /  Alb  4.2  /  TBili  0.8  /  DBili  x   /  AST  14  /  ALT  13  /  AlkPhos  72  02-15      Urinalysis Basic - ( 15 Feb 2024 03:57 )    Color: x / Appearance: x / SG: x / pH: x  Gluc: 116 mg/dL / Ketone: x  / Bili: x / Urobili: x   Blood: x / Protein: x / Nitrite: x   Leuk Esterase: x / RBC: x / WBC x   Sq Epi: x / Non Sq Epi: x / Bacteria: x     HPI: 48 year old male with hx HTN presenting with left chin pain and swelling. Pt notes he noticed a pimple on the R chin on Monday 2/12. He shaved the area, noticed some pus draining from it the next day and presented to urgent care on 2/13. He was prescribed doxycycline 100mg BID and mupirocin and instructed to f/u with an outside dermatologist, which pt did on 2/14. The dermatologist attempted an I&D per the patient however "nothing came out". The patient then developed worsening pain and swelling prompting an ED visit.  Patient is saying he is starting to have some difficulty fully opening his mouth because his lip and chin are swollen.        PAST MEDICAL & SURGICAL HISTORY:  HTN (hypertension)      No significant past surgical history          Review of Systems:  REVIEW OF SYSTEMS      General: no fevers/chills, no lethargy	    Skin/Breast: see HPI  	  Ophthalmologic: no eye pain or change in vision  	  ENMT: no dysphagia or change in hearing    Respiratory and Thorax: no SOB or cough  	  Cardiovascular: no palpitations or chest pain    Gastrointestinal: no abdominal pain or blood in stool     Genitourinary: no dysuria or frequency    Musculoskeletal: no joint pains or weakness	    Neurological: no weakness, numbness , or tingling    MEDICATIONS  (STANDING):    ALLERGIES: No Known Allergies        SOCIAL HISTORY:    Social History:    FAMILY HISTORY:  No pertinent family history in first degree relatives          VITAL SIGNS LAST 24 HOURS:  T(F): 98.9 (02-15 @ 13:57), Max: 99.4 (02-15 @ 01:43)  HR: 93 (02-15 @ 13:57) (91 - 104)  BP: 110/73 (02-15 @ 13:57) (110/73 - 160/98)  RR: 18 (02-15 @ 13:57) (16 - 18)    PHYSICAL EXAM:     The patient was alert and in no apparent distress.  OP showed no ulcerations  There was no visible lymphadenopathy.  Conjunctiva were non injected  There was no clubbing or edema of extremities.  The scalp, hair, face, eyebrows, lips, OP, neck, chest, back,   extremities X 4, nails were examined.  There was no hyperhidrosis or bromhidrosis.    Of note on skin exam:     Edema of the lower lip, right chin and mandible   Ill-defined edematous, warm indurated erythematous plaque on the R chin, jawline and neck extending to the inferior border of the cutaneous lip   Crusted incision site on R chin   ____________________________________    LABS:                        15.1   12.39 )-----------( 275      ( 15 Feb 2024 03:57 )             46.3     02-15    139  |  101  |  15  ----------------------------<  116<H>  4.9   |  25  |  1.50<H>    Ca    9.2      15 Feb 2024 03:57    TPro  7.7  /  Alb  4.2  /  TBili  0.8  /  DBili  x   /  AST  14  /  ALT  13  /  AlkPhos  72  02-15      Urinalysis Basic - ( 15 Feb 2024 03:57 )    Color: x / Appearance: x / SG: x / pH: x  Gluc: 116 mg/dL / Ketone: x  / Bili: x / Urobili: x   Blood: x / Protein: x / Nitrite: x   Leuk Esterase: x / RBC: x / WBC x   Sq Epi: x / Non Sq Epi: x / Bacteria: x     HPI: 48 year old male with hx HTN presenting with left chin pain and swelling. Pt notes he noticed a pimple on the R chin on Monday 2/12. He shaved the area, noticed some pus draining from it the next day and presented to urgent care on 2/13. He was prescribed doxycycline 100mg BID and mupirocin and instructed to f/u with an outside dermatologist, which pt did on 2/14. The dermatologist attempted an I&D per the patient however "nothing came out". The patient then developed worsening pain and swelling prompting an ED visit.  Patient is saying he is starting to have some difficulty fully opening his mouth because his lip and chin are swollen.        PAST MEDICAL & SURGICAL HISTORY:  HTN (hypertension)      No significant past surgical history          Review of Systems:  REVIEW OF SYSTEMS      General: no fevers/chills, no lethargy	    Skin/Breast: see HPI  	  Ophthalmologic: no eye pain or change in vision  	  ENMT: no dysphagia or change in hearing    Respiratory and Thorax: no SOB or cough  	  Cardiovascular: no palpitations or chest pain    Gastrointestinal: no abdominal pain or blood in stool     Genitourinary: no dysuria or frequency    Musculoskeletal: no joint pains or weakness	    Neurological: no weakness, numbness , or tingling    MEDICATIONS  (STANDING):    ALLERGIES: No Known Allergies        SOCIAL HISTORY:    Social History: denies drug use     FAMILY HISTORY:  No pertinent family history in first degree relatives          VITAL SIGNS LAST 24 HOURS:  T(F): 98.9 (02-15 @ 13:57), Max: 99.4 (02-15 @ 01:43)  HR: 93 (02-15 @ 13:57) (91 - 104)  BP: 110/73 (02-15 @ 13:57) (110/73 - 160/98)  RR: 18 (02-15 @ 13:57) (16 - 18)    PHYSICAL EXAM:     The patient was alert and in no apparent distress.  OP showed no ulcerations  There was no visible lymphadenopathy.  Conjunctiva were non injected  There was no clubbing or edema of extremities.  The scalp, hair, face, eyebrows, lips, OP, neck, chest, back,   extremities X 4, nails were examined.  There was no hyperhidrosis or bromhidrosis.    Of note on skin exam:     Edema of the lower lip, right chin and mandible   Ill-defined edematous, warm indurated erythematous plaque on the R chin, jawline and neck extending to the inferior border of the cutaneous lip   Crusted incision site on R chin   ____________________________________    LABS:                        15.1   12.39 )-----------( 275      ( 15 Feb 2024 03:57 )             46.3     02-15    139  |  101  |  15  ----------------------------<  116<H>  4.9   |  25  |  1.50<H>    Ca    9.2      15 Feb 2024 03:57    TPro  7.7  /  Alb  4.2  /  TBili  0.8  /  DBili  x   /  AST  14  /  ALT  13  /  AlkPhos  72  02-15      Urinalysis Basic - ( 15 Feb 2024 03:57 )    Color: x / Appearance: x / SG: x / pH: x  Gluc: 116 mg/dL / Ketone: x  / Bili: x / Urobili: x   Blood: x / Protein: x / Nitrite: x   Leuk Esterase: x / RBC: x / WBC x   Sq Epi: x / Non Sq Epi: x / Bacteria: x

## 2024-02-15 NOTE — ED PROVIDER NOTE - OBJECTIVE STATEMENT
48-year-old male, history of hypertension on lisinopril, now presents with right shin redness and swelling x 2-3 days, associated with fevers and chills.  Patient was seen by the dermatologist yesterday they attempted but were unable to drain any significant pus.  They placed him on doxycycline.  Patient's symptoms have progressed and that prompted this ED visit.  Patient is saying he is starting to have some difficulty fully opening his mouth because his lip and chin are so swollen.  No problems phonating, controlling secretions, swelling of the floor of the mouth, problems swallowing, voice changes.

## 2024-02-15 NOTE — ED CDU PROVIDER INITIAL DAY NOTE - OBJECTIVE STATEMENT
48yM w/pmhx HTN presenting to the ED with right shin pain and swelling x 3 days. Pt states 3 days ago he noticed a pimple to the right chin, he did shave the area, on Tuesday it became red and swollen. Pt was seen at  2 days ago and started on doxycycline, he then saw a dermatologist yesterday who attempted drainage, was told there was no pus and to continue abs and warm compress. 48yM w/pmhx HTN presenting to the ED with right shin pain and swelling x 3 days. Pt states 3 days ago he noticed a pimple to the right chin, he did shave the area, on Tuesday it became red and swollen. Pt was seen at  2 days ago and started on doxycycline, he then saw a dermatologist yesterday who attempted drainage, was told there was no pus and to continue abx and warm compress. Pt came to ED due to increase in swelling. Denies fever/chills, hx of prior skin infections, difficulty swallowing or speaking, sore throat, neck pain, cp, sob, abd pain, n//d, recent travel or illness or any other concerns.   In main ED WBC 12.39, Cr 1.5, CT showing "There is diffuse thickening of the buccal soft tissues along the right   side of the mandible with overlying cellulitis.  No discrete abscess or drainable fluid collection is visualized.  No large dental caries or periapical lucency is visualized.  Findings may be secondary to underlying gingival infection." Pt given doxy and ceftriaxone in ED.  Sent to CDU for derm, ID consults, abx to be given doxy and unasyn, outpt plastics follow up

## 2024-02-15 NOTE — ED PROVIDER NOTE - PROGRESS NOTE DETAILS
PT received in signout from Dr Dubin  Attending HPI: "48-year-old male, history of hypertension on lisinopril, now presents with right shin redness and swelling x 2-3 days, associated with fevers and chills.  Patient was seen by the dermatologist yesterday they attempted but were unable to drain any significant pus. They placed him on doxycycline.  Patient's symptoms have progressed and that prompted this ED visit.  Patient is saying he is starting to have some difficulty fully opening his mouth because his lip and chin are so swollen.  No problems phonating, controlling secretions, swelling of the floor of the mouth, problems swallowing, voice changes."   Vital Signs Last 24 Hrs  T(F): 98.2 HR: 91 BP: 126/87 RR: 16 SpO2: 97% (15 Feb 2024 07:12) (96% - 97%)  PE: as described; my additions and exceptions are noted in the chart    DATA:    LAB:                         15.1   12.39 )-----------( 275      ( 15 Feb 2024 03:57 )             46.3   02-15    139  |  101  |  15  ----------------------------<  116<H>  4.9   |  25  |  1.50<H>    Ca    9.2      15 Feb 2024 03:57    TPro  7.7  /  Alb  4.2  /  TBili  0.8  /  DBili  x   /  AST  14  /  ALT  13  /  AlkPhos  72  02-15     Urinalysis Basic - ( 15 Feb 2024 03:57 )  Color: x / Appearance: x / SG: x / pH: x  Gluc: 116 mg/dL / Ketone: x  / Bili: x / Urobili: x   Blood: x / Protein: x / Nitrite: x   Leuk Esterase: x / RBC: x / WBC x   Sq Epi: x / Non Sq Epi: x / Bacteria: x       IMPRESSION/RISK:  Dx= facial infection    Consideration include staph vs strep vs tinea B  Plan  Add expanded strep coverage adding cep  consider antifungal therapy  TBA CDU for Derm Plastics ID (re 4-6 weeks of therapy with both terbinafine or azoles) if unresponsive

## 2024-02-15 NOTE — ED CDU PROVIDER INITIAL DAY NOTE - PHYSICAL EXAMINATION
Skin: erythema with induration and tenderness to right chin, swelling extends to right lower lip, no fluctuance or spontaneous drainage

## 2024-02-16 VITALS
TEMPERATURE: 98 F | OXYGEN SATURATION: 95 % | SYSTOLIC BLOOD PRESSURE: 116 MMHG | RESPIRATION RATE: 18 BRPM | DIASTOLIC BLOOD PRESSURE: 82 MMHG | HEART RATE: 99 BPM

## 2024-02-16 LAB
ADD ON TEST-SPECIMEN IN LAB: SIGNIFICANT CHANGE UP
ALBUMIN SERPL ELPH-MCNC: 4.1 G/DL — SIGNIFICANT CHANGE UP (ref 3.3–5)
ALP SERPL-CCNC: 71 U/L — SIGNIFICANT CHANGE UP (ref 40–120)
ALT FLD-CCNC: 20 U/L — SIGNIFICANT CHANGE UP (ref 4–41)
ANION GAP SERPL CALC-SCNC: 13 MMOL/L — SIGNIFICANT CHANGE UP (ref 7–14)
AST SERPL-CCNC: 16 U/L — SIGNIFICANT CHANGE UP (ref 4–40)
BASOPHILS # BLD AUTO: 0.06 K/UL — SIGNIFICANT CHANGE UP (ref 0–0.2)
BASOPHILS NFR BLD AUTO: 0.7 % — SIGNIFICANT CHANGE UP (ref 0–2)
BILIRUB SERPL-MCNC: 0.6 MG/DL — SIGNIFICANT CHANGE UP (ref 0.2–1.2)
BUN SERPL-MCNC: 15 MG/DL — SIGNIFICANT CHANGE UP (ref 7–23)
CALCIUM SERPL-MCNC: 9.3 MG/DL — SIGNIFICANT CHANGE UP (ref 8.4–10.5)
CHLORIDE SERPL-SCNC: 101 MMOL/L — SIGNIFICANT CHANGE UP (ref 98–107)
CO2 SERPL-SCNC: 23 MMOL/L — SIGNIFICANT CHANGE UP (ref 22–31)
CREAT SERPL-MCNC: 1.22 MG/DL — SIGNIFICANT CHANGE UP (ref 0.5–1.3)
EGFR: 74 ML/MIN/1.73M2 — SIGNIFICANT CHANGE UP
EOSINOPHIL # BLD AUTO: 0.18 K/UL — SIGNIFICANT CHANGE UP (ref 0–0.5)
EOSINOPHIL NFR BLD AUTO: 2 % — SIGNIFICANT CHANGE UP (ref 0–6)
GLUCOSE SERPL-MCNC: 111 MG/DL — HIGH (ref 70–99)
GRAM STN FLD: ABNORMAL
GRAM STN FLD: SIGNIFICANT CHANGE UP
HCT VFR BLD CALC: 45.6 % — SIGNIFICANT CHANGE UP (ref 39–50)
HGB BLD-MCNC: 14.9 G/DL — SIGNIFICANT CHANGE UP (ref 13–17)
IANC: 5.63 K/UL — SIGNIFICANT CHANGE UP (ref 1.8–7.4)
IMM GRANULOCYTES NFR BLD AUTO: 0.3 % — SIGNIFICANT CHANGE UP (ref 0–0.9)
LYMPHOCYTES # BLD AUTO: 2.12 K/UL — SIGNIFICANT CHANGE UP (ref 1–3.3)
LYMPHOCYTES # BLD AUTO: 23.9 % — SIGNIFICANT CHANGE UP (ref 13–44)
MCHC RBC-ENTMCNC: 28.4 PG — SIGNIFICANT CHANGE UP (ref 27–34)
MCHC RBC-ENTMCNC: 32.7 GM/DL — SIGNIFICANT CHANGE UP (ref 32–36)
MCV RBC AUTO: 87 FL — SIGNIFICANT CHANGE UP (ref 80–100)
MONOCYTES # BLD AUTO: 0.86 K/UL — SIGNIFICANT CHANGE UP (ref 0–0.9)
MONOCYTES NFR BLD AUTO: 9.7 % — SIGNIFICANT CHANGE UP (ref 2–14)
NEUTROPHILS # BLD AUTO: 5.63 K/UL — SIGNIFICANT CHANGE UP (ref 1.8–7.4)
NEUTROPHILS NFR BLD AUTO: 63.4 % — SIGNIFICANT CHANGE UP (ref 43–77)
NRBC # BLD: 0 /100 WBCS — SIGNIFICANT CHANGE UP (ref 0–0)
NRBC # FLD: 0 K/UL — SIGNIFICANT CHANGE UP (ref 0–0)
PLATELET # BLD AUTO: 273 K/UL — SIGNIFICANT CHANGE UP (ref 150–400)
POTASSIUM SERPL-MCNC: 4.2 MMOL/L — SIGNIFICANT CHANGE UP (ref 3.5–5.3)
POTASSIUM SERPL-SCNC: 4.2 MMOL/L — SIGNIFICANT CHANGE UP (ref 3.5–5.3)
PROT SERPL-MCNC: 7.5 G/DL — SIGNIFICANT CHANGE UP (ref 6–8.3)
RBC # BLD: 5.24 M/UL — SIGNIFICANT CHANGE UP (ref 4.2–5.8)
RBC # FLD: 13.2 % — SIGNIFICANT CHANGE UP (ref 10.3–14.5)
SODIUM SERPL-SCNC: 137 MMOL/L — SIGNIFICANT CHANGE UP (ref 135–145)
SPECIMEN SOURCE: SIGNIFICANT CHANGE UP
WBC # BLD: 8.88 K/UL — SIGNIFICANT CHANGE UP (ref 3.8–10.5)
WBC # FLD AUTO: 8.88 K/UL — SIGNIFICANT CHANGE UP (ref 3.8–10.5)

## 2024-02-16 PROCEDURE — 99239 HOSP IP/OBS DSCHRG MGMT >30: CPT

## 2024-02-16 PROCEDURE — 99283 EMERGENCY DEPT VISIT LOW MDM: CPT

## 2024-02-16 RX ORDER — CEPHALEXIN 500 MG
500 CAPSULE ORAL
Refills: 0 | Status: DISCONTINUED | OUTPATIENT
Start: 2024-02-16 | End: 2024-02-18

## 2024-02-16 RX ORDER — CEPHALEXIN 500 MG
1 CAPSULE ORAL
Qty: 28 | Refills: 0
Start: 2024-02-16 | End: 2024-02-22

## 2024-02-16 RX ADMIN — Medication 500 MILLIGRAM(S): at 11:57

## 2024-02-16 RX ADMIN — Medication 60 MILLIGRAM(S): at 08:03

## 2024-02-16 RX ADMIN — Medication 500 MILLIGRAM(S): at 05:47

## 2024-02-16 RX ADMIN — Medication 100 MILLIGRAM(S): at 11:00

## 2024-02-16 RX ADMIN — Medication 100 MILLIGRAM(S): at 10:20

## 2024-02-16 RX ADMIN — LISINOPRIL 10 MILLIGRAM(S): 2.5 TABLET ORAL at 08:03

## 2024-02-16 NOTE — ED CDU PROVIDER SUBSEQUENT DAY NOTE - HISTORY
Pt is a 49 YO M with PMH HTN who presented to ED with right facial swelling x 3 days. Pt reports he was started on Doxycycline 2 days ago with minimal relief and subsequently followed up with dermatology who attempted I&D without success. Pt presented to ED with increased swelling and redness. In ED, pt with mild leukocytosis 12.39, Cr 1.5 and CT scan significant for "There is diffuse thickening of the buccal soft tissues along the right side of the mandible with overlying cellulitis.  No discrete abscess or drainable fluid collection is visualized.  No large dental caries or periapical lucency is visualized.  Findings may be secondary to underlying gingival infection." Pt placed in CDU for antibiotics, ID and Dermatology consults.   In the interim, pt reports some improvement to swelling and redness of face.

## 2024-02-16 NOTE — ED CDU PROVIDER DISPOSITION NOTE - CARE PROVIDER_API CALL
Dg Howard  Dermatology  [address], [address]  [place] [address]  Phone: [phone]  Fax: [phone]  Follow Up Time:

## 2024-02-16 NOTE — ED CDU PROVIDER DISPOSITION NOTE - PATIENT PORTAL LINK FT
You can access the FollowMyHealth Patient Portal offered by [facility] by registering at the following website: [email or web address]. By joining [facility]’s FollowMyHealth portal, you will also be able to view your health information using other applications (apps) compatible with our system.

## 2024-02-16 NOTE — ED CDU PROVIDER DISPOSITION NOTE - NSFOLLOWUPINSTRUCTIONS_ED_ALL_ED_FT
** You were seen in the emergency room for redness and swelling to the right side of your chin.  While in the emergency room you were found to have an infection called cellulitis.  You were started on IV doxycycline.  Antibiotic and you were seen by dermatology and infectious disease who are okay with discharging you home with 2 antibiotics by mouth:    1.  Doxycycline 100 mg take 1 tablet by mouth every 12 hours for 7 days  2.  Keflex (cephalexin) 500 mg take 1 tablet by mouth every 6 hours.    Please follow-up with dermatology for your final culture report as when you were in the emergency room they took specimens of the infection to ensure that you are on the proper antibiotics.  Please return if any new or worsening symptoms such as but not limited to worsening redness, worsening swelling, worsening pain, increased discharge, fever, chills, nausea, shortness of breath, drooling, change in voice, inability to tolerate things by mouth, or any other concerning symptoms.  We have attached all of your test results to your discharge papers so that you may follow-up appropriately with your primary care doctor and dermatology as discussed within 1 week.**    Cellulitis, Adult  A person's legs and feet. One leg is normal and the other leg is affected by cellulitis.  Cellulitis is a skin infection. The infected area is often warm, red, swollen, and sore. It occurs most often on the legs, feet, and toes, but can happen on any part of the body.    This condition can be life-threatening without treatment. It is very important to get treated right away.    What are the causes?  This condition is caused by bacteria. The bacteria enter through a break in the skin, such as:  A cut.  A burn.  A bug bite.  An animal bite.  An open sore.  A crack.  What increases the risk?  Having a weak body's defense system (immune system).  Being older than 60 years old.  Having a blood sugar problem (diabetes).  Having a long-term liver disease (cirrhosis) or kidney disease.  Being very overweight (obese).  Having a skin problem, such as:  An itchy rash.  A rash caused by a fungus.  A rash with blisters.  Slow movement of blood in the veins (venous stasis).  Fluid buildup below the skin (edema).  This condition is more likely to occur in people who:  Have open cuts, burns, bites, or scrapes on the skin.  Have been treated with high-energy rays (radiation).  Use IV drugs.  What are the signs or symptoms?  Skin that:  Looks red or purple, or slightly darker than your usual skin color.  Has streaks.  Has spots.  Is swollen.  Is sore or painful when you touch it.  Is warm.  A fever.  Chills.  Blisters.  Tiredness (fatigue).  How is this treated?  Medicines to treat infections or allergies.  Rest.  Placing cold or warm cloths on the skin.  Staying in the hospital, if the condition is very bad. You may need medicines through an IV.  Follow these instructions at home:  Medicines    Take over-the-counter and prescription medicines only as told by your doctor.  If you were prescribed antibiotics, take them as told by your doctor. Do not stop using them even if you start to feel better.  General instructions    Drink enough fluid to keep your pee (urine) pale yellow.  Do not touch or rub the infected area.  Raise (elevate) the infected area above the level of your heart while you are sitting or lying down.  Return to your normal activities when your doctor says that it is safe.  Place cold or warm cloths on the area as told by your doctor.  Keep all follow-up visits. Your doctor will need to make sure that a more serious infection is not developing.  Contact a doctor if:  You have a fever.  You do not start to get better after 1–2 days of treatment.  Your bone or joint under the infected area starts to hurt after the skin has healed.  Your infection comes back in the same area or another area. Signs of this may include:  You have a swollen bump in the area.  Your red area gets larger, turns dark in color, or hurts more.  You have more fluid coming from the wound.  Pus or a bad smell develops in your infected area.  You have more pain.  You feel sick and have muscle aches and weakness.  You develop vomiting or watery poop that will not go away.  Get help right away if:  You see red streaks coming from the area.  You notice the skin turns purple or black and falls off.  These symptoms may be an emergency. Get help right away. Call 911.  Do not wait to see if the symptoms will go away.  Do not drive yourself to the hospital.  This information is not intended to replace advice given to you by your health care provider. Make sure you discuss any questions you have with your health care provider.

## 2024-02-16 NOTE — ED CDU PROVIDER DISPOSITION NOTE - NSFOLLOWUPCLINICS_GEN_ALL_ED_FT
[facility] - [facility]  Dermatology  [address], [address]  [place] [address]  Phone: [phone]  Fax: [phone]

## 2024-02-16 NOTE — PROGRESS NOTE ADULT - SUBJECTIVE AND OBJECTIVE BOX
CC: F/U for Abscess    Saw/spoke to patient. No fevers, no chills. No new complaints.    Allergies  No Known Allergies    ANTIMICROBIALS:  cephalexin 500 four times a day  doxycycline IVPB 100 every 12 hours    PE:    Vital Signs Last 24 Hrs  T(C): 36.9 (16 Feb 2024 13:54), Max: 37.6 (15 Feb 2024 21:24)  T(F): 98.5 (16 Feb 2024 13:54), Max: 99.6 (15 Feb 2024 21:24)  HR: 99 (16 Feb 2024 13:54) (87 - 101)  BP: 116/82 (16 Feb 2024 13:54) (105/74 - 131/84)  RR: 18 (16 Feb 2024 13:54) (16 - 18)  SpO2: 95% (16 Feb 2024 13:54) (95% - 100%)    Gen: AOx3, NAD, non-toxic, chin lesion improved, still slight swollen  CV: Nontachycardic  Resp: Breathing comfortably, RA  Abd: Soft, nontender  IV/Skin: No thrombophlebitis    LABS:                        14.9   8.88  )-----------( 273      ( 16 Feb 2024 06:20 )             45.6     02-16    137  |  101  |  15  ----------------------------<  111<H>  4.2   |  23  |  1.22    Ca    9.3      16 Feb 2024 06:20    TPro  7.5  /  Alb  4.1  /  TBili  0.6  /  DBili  x   /  AST  16  /  ALT  20  /  AlkPhos  71  02-16    Urinalysis Basic - ( 16 Feb 2024 06:20 )    Color: x / Appearance: x / SG: x / pH: x  Gluc: 111 mg/dL / Ketone: x  / Bili: x / Urobili: x   Blood: x / Protein: x / Nitrite: x   Leuk Esterase: x / RBC: x / WBC x   Sq Epi: x / Non Sq Epi: x / Bacteria: x    MICROBIOLOGY:    .Abscess skin right chin  02-15-24 --  --    No polymorphonuclear cells seen per low power field  No organisms seen per oil power field    RADIOLOGY:    2/15 CT    IMPRESSION:  There is diffuse thickening of the buccal soft tissues along the right   side of the mandible with overlying cellulitis.  No discrete abscess or   drainable fluid collection is visualized.  No large dental caries or   periapical lucency is visualized.  Findings may be secondary to   underlying gingival infection..

## 2024-02-16 NOTE — ED CDU PROVIDER SUBSEQUENT DAY NOTE - CLINICAL SUMMARY MEDICAL DECISION MAKING FREE TEXT BOX
Pt is a 49 YO M with PMH HTN who presented to ED with right facial swelling x 3 days. Pt reports he was started on Doxycycline 2 days ago with minimal relief and subsequently followed up with dermatology who attempted I&D without success. Pt presented to ED with increased swelling and redness. In ED, pt with mild leukocytosis 12.39, Cr 1.5 and CT scan significant for "There is diffuse thickening of the buccal soft tissues along the right side of the mandible with overlying cellulitis.  No discrete abscess or drainable fluid collection is visualized.  No large dental caries or periapical lucency is visualized.  Findings may be secondary to underlying gingival infection." Pt placed in CDU for antibiotics, ID and Dermatology consults.

## 2024-02-16 NOTE — ED CDU PROVIDER DISPOSITION NOTE - NSPTACCESSSVCSAPPTDETAILS_ED_ALL_ED_FT
skin infection to the chin, seen in ER by dermatology who did a culture wants to follow up within 1 week. thank you

## 2024-02-16 NOTE — PROGRESS NOTE ADULT - ASSESSMENT
49 yo M HTN, initially with R chin swelling and redness  Fever, leukocytosis  R chin swelling, initially pustule, then cut with razor while shaving  Dermatologist lanced site and obtained culture--not available  CT diffuse thickening of soft tissue R mandible--cellulitis, no abscess  Most likely staph based on history of pustule  Wound culture pending  Overall, Cellulitis, wound infection  - Doxycycline 100mg q 12, 7 day course  - Keflex 500mg q 6, 7 day course  - Monitor for alternate signs infection  - If DC planning, culture needs to be followed to ensure no unexpected pathogen  - Monitor site for improvement    Rosendo Mata MD  Contact on TEAMS messaging from 9am - 5pm  From 5pm-9am, on weekends, or if no response call [phone]

## 2024-02-16 NOTE — ED CDU PROVIDER SUBSEQUENT DAY NOTE - ATTENDING APP SHARED VISIT CONTRIBUTION OF CARE
Attending Statement: I have reviewed and agree with all pertinent clinical information, including history and physical exam and agree with treatment plan of the PA, except as noted.  48-year-old male history of hypertension presented to the emergency department with swelling, redness to the right lower chin for about 3 days.  With fever at home Tmax of 100 orally and chills.  Was seen by his dermatologist patient to try to do an I&D was unsuccessful for sent to ER for evaluation.  Found to have a white count of 12.3 CT maxillofacial diffuse thickening of the buccal soft tissues along the right side of the mandible with overlying cellulitis.  No discrete abscess for drainable fluid collection.  No large dental caries or periapical lucency visualized.  Findings may be secondary to underlying change in bowel infection.  Patient to be seen by infectious disease and dermatology.  Doxycycline, Keflex and continue to monitor.  No events overnight patient states he is feeling better.  No change in voice no difficulty swallowing or eating no chest pain or shortness of breath.  Vitals noted patient sitting up in bed with mild swelling to the lower right lip and anterior chin with mild swelling noted by supplemental.  Area is soft mild tenderness, no crepitus.  No discharge.  Supple neck.  No trismus.  No drooling no stridor.  Plan continue antibiotics, pain meds as needed and continue to monitor.  Plan discussed with patient

## 2024-02-17 LAB
-  AMPICILLIN/SULBACTAM: SIGNIFICANT CHANGE UP
-  CEFAZOLIN: SIGNIFICANT CHANGE UP
-  CLINDAMYCIN: SIGNIFICANT CHANGE UP
-  ERYTHROMYCIN: SIGNIFICANT CHANGE UP
-  GENTAMICIN: SIGNIFICANT CHANGE UP
-  OXACILLIN: SIGNIFICANT CHANGE UP
-  PENICILLIN: SIGNIFICANT CHANGE UP
-  RIFAMPIN: SIGNIFICANT CHANGE UP
-  TETRACYCLINE: SIGNIFICANT CHANGE UP
-  TRIMETHOPRIM/SULFAMETHOXAZOLE: SIGNIFICANT CHANGE UP
-  VANCOMYCIN: SIGNIFICANT CHANGE UP
METHOD TYPE: SIGNIFICANT CHANGE UP

## 2024-02-20 LAB
CULTURE RESULTS: ABNORMAL
ORGANISM # SPEC MICROSCOPIC CNT: ABNORMAL
ORGANISM # SPEC MICROSCOPIC CNT: ABNORMAL
SPECIMEN SOURCE: SIGNIFICANT CHANGE UP

## 2024-09-19 ENCOUNTER — APPOINTMENT (OUTPATIENT)
Dept: PULMONOLOGY | Facility: CLINIC | Age: 49
End: 2024-09-19
Payer: COMMERCIAL

## 2024-09-19 VITALS
HEIGHT: 66 IN | TEMPERATURE: 97 F | RESPIRATION RATE: 15 BRPM | HEART RATE: 81 BPM | BODY MASS INDEX: 31.02 KG/M2 | OXYGEN SATURATION: 97 % | DIASTOLIC BLOOD PRESSURE: 93 MMHG | SYSTOLIC BLOOD PRESSURE: 134 MMHG | WEIGHT: 193 LBS

## 2024-09-19 DIAGNOSIS — R06.83 SNORING: ICD-10-CM

## 2024-09-19 PROCEDURE — 99203 OFFICE O/P NEW LOW 30 MIN: CPT

## 2024-09-19 NOTE — ASSESSMENT
[FreeTextEntry1] : This is a 49-year-old male with a significant past medical history of hypertension and snoring who comes in for a sleep evaluation/establish care.  1. Possible sleep apnea: The patient reports waking up tired after a full night of rest, which has been occurring for approximately 10 years. He is concerned about sleep apnea and requests a sleep apnea test.  He was referred to the [facility] for a diagnostic HSAT. The ramifications of MARIBELL and its potential therapeutic modalities were discussed with the patient. The patient was cautioned on the importance of avoiding drowsy driving. He will follow up with us after the HSAT.  The patient will follow up in 2 weeks after the sleep apnea test results are available.

## 2024-09-19 NOTE — PHYSICAL EXAM
[No Acute Distress] : no acute distress [Enlarged Base of the Tongue] : enlarged base of the tongue [III] : Mallampati Class: III [Normal Appearance] : normal appearance [No Neck Mass] : no neck mass [Normal Rate/Rhythm] : normal rate/rhythm [Normal S1, S2] : normal s1, s2 [No Murmurs] : no murmurs [No Resp Distress] : no resp distress [Clear to Auscultation Bilaterally] : clear to auscultation bilaterally [No Clubbing] : no clubbing [No Cyanosis] : no cyanosis [No Edema] : no edema [No Focal Deficits] : no focal deficits [Oriented x3] : oriented x3 [Normal Affect] : normal affect

## 2024-09-19 NOTE — HISTORY OF PRESENT ILLNESS
[Awakes Unrefreshed] : awakes unrefreshed [Awakes with Dry Mouth] : awakes with dry mouth [Awakes with Headache] : awakes with headache [Witnessed Apneas] : witnessed apneas [Never] : never [TextBox_4] : This is a 49-year-old male with a significant past medical history of hypertension and snoring who comes in for a sleep evaluation/establish care.  The patient reports experiencing tiredness after a full night of rest. He is concerned about the possibility of having sleep apnea and would like to undergo a sleep apnea test. He mentions that this issue has been ongoing for about 10 years. [Snoring] : snoring [Difficulty Maintaining Sleep] : does not have difficulty maintaining sleep [Recent  Weight Gain] : no recent weight gain [DIS] : does not have difficulty initiating sleep [DMS] : does not have difficulty maintaining sleep [Hypersomnolence] : no hypersomnolence [Cataplexy] : no cataplexy [Sleep Paralysis] : no sleep paralysis [Hypnagogic Hallucinations] : no hypnagogic hallucinations [Hypnopompic Hallucinations] : no hypnopompic hallucinations [Lower Extremity Discomfort] : does not have lower extremity discomfort [Irresistible urge to move legs] : does not have irresistible urge to move legs [Late day/ Evening symptoms] : does not have late day/ evening symptoms [Sleep Disturbances due to LE symptoms] : denies sleep disturbances due to lower extremity symptoms [TextBox_77] : 10pm [TextBox_79] : 5am [TextBox_81] : 0-15 [TextBox_83] : 0 [TextBox_89] : 0-1 [ESS] : 4

## 2024-10-21 ENCOUNTER — APPOINTMENT (OUTPATIENT)
Dept: SLEEP CENTER | Facility: CLINIC | Age: 49
End: 2024-10-21

## 2025-02-07 ENCOUNTER — APPOINTMENT (OUTPATIENT)
Facility: HOSPITAL | Age: 50
End: 2025-02-07

## 2025-02-07 ENCOUNTER — OUTPATIENT (OUTPATIENT)
Dept: OUTPATIENT SERVICES | Facility: HOSPITAL | Age: 50
LOS: 1 days | Discharge: HOME | End: 2025-02-07

## 2025-02-07 DIAGNOSIS — G47.30 SLEEP APNEA, UNSPECIFIED: ICD-10-CM

## 2025-02-07 PROCEDURE — 95810 POLYSOM 6/> YRS 4/> PARAM: CPT | Mod: 26

## 2025-02-25 ENCOUNTER — APPOINTMENT (OUTPATIENT)
Dept: PULMONOLOGY | Facility: CLINIC | Age: 50
End: 2025-02-25
Payer: COMMERCIAL

## 2025-02-25 VITALS
HEART RATE: 78 BPM | DIASTOLIC BLOOD PRESSURE: 78 MMHG | TEMPERATURE: 97.9 F | SYSTOLIC BLOOD PRESSURE: 123 MMHG | OXYGEN SATURATION: 96 % | WEIGHT: 210 LBS | HEIGHT: 66 IN | BODY MASS INDEX: 33.75 KG/M2

## 2025-02-25 DIAGNOSIS — G47.33 OBSTRUCTIVE SLEEP APNEA (ADULT) (PEDIATRIC): ICD-10-CM

## 2025-02-25 PROCEDURE — 99214 OFFICE O/P EST MOD 30 MIN: CPT

## 2025-02-25 PROCEDURE — G2211 COMPLEX E/M VISIT ADD ON: CPT | Mod: NC

## 2025-02-27 ENCOUNTER — APPOINTMENT (OUTPATIENT)
Dept: SLEEP CENTER | Facility: CLINIC | Age: 50
End: 2025-02-27

## 2025-03-26 ENCOUNTER — APPOINTMENT (OUTPATIENT)
Dept: PULMONOLOGY | Facility: CLINIC | Age: 50
End: 2025-03-26

## 2025-04-25 ENCOUNTER — APPOINTMENT (OUTPATIENT)
Dept: SLEEP CENTER | Facility: CLINIC | Age: 50
End: 2025-04-25

## 2025-05-28 ENCOUNTER — APPOINTMENT (OUTPATIENT)
Dept: PULMONOLOGY | Facility: CLINIC | Age: 50
End: 2025-05-28
Payer: COMMERCIAL

## 2025-05-28 VITALS
OXYGEN SATURATION: 96 % | BODY MASS INDEX: 35.03 KG/M2 | TEMPERATURE: 98 F | WEIGHT: 218 LBS | DIASTOLIC BLOOD PRESSURE: 87 MMHG | SYSTOLIC BLOOD PRESSURE: 133 MMHG | HEIGHT: 66 IN | RESPIRATION RATE: 15 BRPM | HEART RATE: 72 BPM

## 2025-05-28 DIAGNOSIS — G47.33 OBSTRUCTIVE SLEEP APNEA (ADULT) (PEDIATRIC): ICD-10-CM

## 2025-05-28 PROCEDURE — G2211 COMPLEX E/M VISIT ADD ON: CPT | Mod: NC

## 2025-05-28 PROCEDURE — 99212 OFFICE O/P EST SF 10 MIN: CPT
